# Patient Record
Sex: MALE | Race: WHITE | NOT HISPANIC OR LATINO | Employment: FULL TIME | URBAN - METROPOLITAN AREA
[De-identification: names, ages, dates, MRNs, and addresses within clinical notes are randomized per-mention and may not be internally consistent; named-entity substitution may affect disease eponyms.]

---

## 2022-09-16 PROBLEM — F41.9 ANXIETY AND DEPRESSION: Status: ACTIVE | Noted: 2022-09-16

## 2022-09-16 PROBLEM — G43.109 MIGRAINE WITH AURA AND WITHOUT STATUS MIGRAINOSUS, NOT INTRACTABLE: Status: ACTIVE | Noted: 2022-09-16

## 2022-09-16 PROBLEM — N50.819 PERSISTENT TESTICULAR PAIN: Status: ACTIVE | Noted: 2022-09-16

## 2022-09-16 PROBLEM — G89.29 CHRONIC RIGHT-SIDED LOW BACK PAIN WITH RIGHT-SIDED SCIATICA: Status: ACTIVE | Noted: 2022-09-16

## 2022-09-16 PROBLEM — F32.A ANXIETY AND DEPRESSION: Status: ACTIVE | Noted: 2022-09-16

## 2022-09-16 PROBLEM — M54.41 CHRONIC RIGHT-SIDED LOW BACK PAIN WITH RIGHT-SIDED SCIATICA: Status: ACTIVE | Noted: 2022-09-16

## 2022-09-16 PROBLEM — E66.811 CLASS 1 OBESITY WITH BODY MASS INDEX (BMI) OF 34.0 TO 34.9 IN ADULT: Status: ACTIVE | Noted: 2022-09-16

## 2022-09-16 PROBLEM — Z98.890 HISTORY OF NISSEN FUNDOPLICATION: Status: ACTIVE | Noted: 2022-09-16

## 2022-09-16 PROBLEM — K21.9 GASTROESOPHAGEAL REFLUX DISEASE: Status: ACTIVE | Noted: 2022-09-16

## 2022-09-16 PROBLEM — E66.9 CLASS 1 OBESITY WITH BODY MASS INDEX (BMI) OF 34.0 TO 34.9 IN ADULT: Status: ACTIVE | Noted: 2022-09-16

## 2023-03-02 PROBLEM — F32.A ANXIETY AND DEPRESSION: Status: RESOLVED | Noted: 2022-09-16 | Resolved: 2023-03-02

## 2023-03-02 PROBLEM — F41.9 ANXIETY AND DEPRESSION: Status: RESOLVED | Noted: 2022-09-16 | Resolved: 2023-03-02

## 2023-03-02 PROBLEM — R41.840 LACK OF CONCENTRATION: Status: RESOLVED | Noted: 2023-03-02 | Resolved: 2023-03-02

## 2023-03-02 PROBLEM — F43.22 ADJUSTMENT DISORDER WITH ANXIETY: Status: ACTIVE | Noted: 2023-03-02

## 2023-03-02 PROBLEM — R41.840 LACK OF CONCENTRATION: Status: ACTIVE | Noted: 2023-03-02

## 2023-03-10 ENCOUNTER — SOCIAL WORK (OUTPATIENT)
Dept: ADMINISTRATIVE | Facility: OTHER | Age: 33
End: 2023-03-10

## 2023-03-10 NOTE — PROGRESS NOTES
[facility] received a referral to assist with ADHD testing. [facility] had received a consult to see Patient. However, the Clinic does not have a psychologist on staff. Sw mailed Patient the contact information for psychologists. He was encouraged to contact one to schedule an assessment if he was still in need of services.    Lauren Johnson LCSW    [phone]

## 2023-07-27 PROBLEM — G47.00 INSOMNIA: Status: ACTIVE | Noted: 2023-07-27

## 2023-11-03 PROBLEM — R10.816 EPIGASTRIC ABDOMINAL TENDERNESS: Status: ACTIVE | Noted: 2023-11-03

## 2023-11-03 PROBLEM — K29.70 GASTRITIS: Status: ACTIVE | Noted: 2023-11-03

## 2023-11-03 PROBLEM — M84.350A STRESS FRACTURE OF PELVIS: Status: ACTIVE | Noted: 2023-11-03

## 2023-11-03 PROBLEM — M25.559 ARTHRALGIA OF HIP: Status: ACTIVE | Noted: 2023-11-03

## 2023-11-03 PROBLEM — K20.90 ESOPHAGITIS: Status: ACTIVE | Noted: 2023-11-03

## 2023-11-03 PROBLEM — K44.9 HIATAL HERNIA: Status: ACTIVE | Noted: 2023-11-03

## 2023-11-03 PROBLEM — K25.9 GASTRIC ULCER: Status: ACTIVE | Noted: 2023-11-03

## 2023-11-03 PROBLEM — M25.579 ANKLE PAIN: Status: ACTIVE | Noted: 2023-11-03

## 2023-11-03 PROBLEM — Z02.89 HEALTH EXAMINATION OF DEFINED SUBPOPULATION: Status: ACTIVE | Noted: 2023-11-03

## 2023-11-03 PROBLEM — F43.20 ADJUSTMENT DISORDER: Status: ACTIVE | Noted: 2023-11-03

## 2024-10-09 ENCOUNTER — PATIENT MESSAGE (OUTPATIENT)
Dept: ADMINISTRATIVE | Facility: OTHER | Age: 34
End: 2024-10-09